# Patient Record
(demographics unavailable — no encounter records)

---

## 2024-11-04 NOTE — DISCUSSION/SUMMARY
[FreeTextEntry1] : Eddi presents 2 weeks post operatively s/p Facial Feminization Surgery a week ago (Brow, Nose, Jawline) Patient is without complains and is happy with the result; Swelling and ecchymosis are still present but improving; Incisions are intact; Hairline with dissolvable sutures and eschars some chin numbness Advised to sleep with head at 45 degrees to reduce swelling; Tolerating a soft diet; Advance to regular diet; Resume normal activities; Stop any narcotics; Take Motrin 600mg with food every 6 hours; Continue with Jawbra daily x 1 week then at nighttime for 6 weeks Start nasal saline spray BID for 2 weeks and Afrin spray BID for 3 days Follow up in 2 weeks.

## 2024-11-28 NOTE — DISCUSSION/SUMMARY
[FreeTextEntry1] : Eddi presents 5 weeks post-operatively, s/p FFS (brow lift, frontal sinus setback, SOB recontouring, jawline and chin, rhinoplasty with grafting, buccal fat removal, platysmaplasty and tracheal shave, upper lip augmentation) on 10/15/24  Patient recovering well with no significant complaints; denies pain and has discontinued all pain medication. Has mild asymmetry of jaw; reassured patient, likely due to asymmetrical swelling. Continue to adhere to post-op recommendations and follow up to reassess (in 2-3 months) Mild swelling of lower face, mild numbness along surgical sites (hairline, jawline, submentum), no bruising. -Patient denies needing refills on medication  -Maintaining good oral care/hygiene, using regular mouthwash and maintaining regular oral hygiene with gentle brushing. -Patient maintains soft food diet, progressing to normal diet slowly.. Denies nausea or vomiting.  -Reviewed incision care. Patient cleanses daily, hairline every other day. Using hydrogen peroxide on clean q-tip to gently remove build-up and dried blood (especially at nose and hairline). -At this time, all non-dissolvable sutures have been removed.  -Nose-taping daily, can progress to just nightly. -Patient can use saline nasal spray prn. Use Afrin prn congestion 2x daily for a maximum for 2 days. Incorporate humidifier if dry air at home. -Educated patient on dissolvable nature of all other sutures.  On exam: -Incision sites are healing well, well-approximated with no evidence of infection. Incision sites located at submentum, nostrils, intraorally, and at hairline. -Negative for signs of inflammation (redness, significant pain or swelling, discharge, foul odor/smell). -Patient to cover incision sites of scalp with scarf/hat if in direct sunlight.  -Little to no ecchymosis present about periorbital and mid face regions and moderate edema. Reassured patient. -Patient to continue to use jawbra nightly, can use daily at home if she desires. -Maintain head elevation while sleeping, at least 30 degrees, using two to three pillows or a wedge pillow.  -Use bacitracin ointment on surgical incisions about nose, keep lips hydrated with vaseline. Okay to start scar gel at this time  -Patient to contact office or come in sooner than follow-up appointment if she notices discharge/drainage, foul odor/taste, uncontrolled bleeding, severe pain not improved with pain-medication regimen, dramatic increase in swelling, nausea//vomiting, chills/fever, or onset of new symptoms.  -Reviewed common post-op occurrences and adverse reactions. All of patient's questions and concerns were answered and addressed during this appointment.  -In one more week, at 6 week post-op, patient can return to electrolysis and normal activity, except for high impact/contact sports and activities.  -Patient to advance activity as tolerated, but is not encouraged to engage in exercise, heavy lifting, or having head below heart for prolonged duration.  -Patient to call to schedule follow up, follow-up in approximately 2-3 months

## 2025-01-04 NOTE — DISCUSSION/SUMMARY
[FreeTextEntry1] : S/P Facial Feminization Surgery: 10/15/2024 (Brow, Nose, Jawline) Patient is without complains and is happy with the result; Mild swelling is still present in the nose and jawline but improving; Incisions are healed, no hypertrophic scars Some chin numbness but has improved Advised to sleep with head at 30 degrees to reduce swelling; Continue regular diet; Continue normal activities; Continue with Jawbra at nighttime prn Continue nasal saline spray BID Follow up in 3 months